# Patient Record
(demographics unavailable — no encounter records)

---

## 2021-10-31 NOTE — NUR
Patient c/o increased rectal/vaginal pain and pressure. Vaginal exam by this
RN. Large solid mass to right vaginal wall palpated. Dr. Orellana notified. See
physician notification.

## 2021-10-31 NOTE — NUR
1328- Prolonged FHR decel lasting 2.5min. Right lateral. LR bolus.
 
1332- Prolonged FHR decel lasting 4min. Left lateral. LR bolus. FSE placed.
SVE 8/90/0. Patient wedge right. FHR baseline 130 with moderate variability.
Dr. Orellana updated. See physician notification.
 
1359- Prolonged FHR decel to 80bmp. Patient Left lateral. LR bolus.
 
1400- FHR continues 80bmp. Patient right lateral. LR bolus continues.
 
1402- Patient to hands and knees position. O2 on via simple mask. LR bolus
continues.
 
1406- Dr. Orellana updated on pt. See physician notification. Patient and family
updated on plan of care. RN remains at bedside.
 
1410- FHR 120bmp. RN remains at bedside.
 
1414- Dr. Orellana at bedside. SVE 9.5cm. Patient wedge right.
 
1415- Dr. Orellana remains on unit.
 
1417- Prolonged FHR decel to 70bpm. Patient right and left lateral. LR bolus
and O2 at 10L via simple mask.
 
1421- SVE per Dr. Orellana 10cm. Patient repositioned in footplates and
instructed on pushing with contractions.
 
1429- Patient continues to push with contractions with Dr. Orellana at bedside.
Strong maternal effort. Moves vertex well. FHR noted to be 80-90's between
ctx. Dr. Orellana consents pt for VAVD. Patient agrees.
 
1432- Vacuum applied to fetal occiput by Dr. Orellana. Traction applied to fetal
occiput by Dr. Orellana as patient continues to push with ctx. Small crown noted.
 
1434- VAVD of viable female infant. Nuchal cord x1 reduced on perineum. Cord
clamped x2 and cut. To mother's chest where dried and stimulated by nursery
RN. Care of  assumed by MINERVA Garcia RN.
 
1438- Spontaneous and intact delivery of placenta. Pitocin to 333ml/hr per
protocol. Second degree perineal laceration and left labial laceration
repaired by Dr. Orellana.
 
1445- Fundus firm, midline, and bleeding minimal. Riri care provided, pads
changed, ice pack to perineum, and pt repositioned in bed. Plan of care and
safety precautions reviewed with patient and family who verbalize
understanding. Resting with call light within reach.

## 2021-10-31 NOTE — NUR
Pt arrives ambulatory with this RN and mother. States feeling a "pop" while
bending over to clean at 0937. Sat on the toilet after, and felt fluid
continuing to leak out that was not urine. Scant bloody show. Irregular ctx,
and reports GFM. Changed into a clean gown.

## 2021-10-31 NOTE — NUR
Patient reports increased rectal and vaginal pain. Perineum examined by this
RN. No s/s of hematoma.

## 2021-11-01 NOTE — NUR
Initial visit attempt; Nurse with patient,  left card of
congratulations and God's blessings for the birth of their daughter and
information regarding the availability of spiritual care at [facility].

## 2021-11-02 NOTE — NUR
REVIEW DISCHARGE INSTRUCTIONS, DENIES NEEDS OR QUESTIONS,  PT DISCHARGED
AMBULATORY WITH HER MOM AND INFANT.  ACCOMPANIED BY UNIT NURSE TO CAR.